# Patient Record
Sex: MALE | Race: BLACK OR AFRICAN AMERICAN
[De-identification: names, ages, dates, MRNs, and addresses within clinical notes are randomized per-mention and may not be internally consistent; named-entity substitution may affect disease eponyms.]

---

## 2020-05-07 ENCOUNTER — HOSPITAL ENCOUNTER (OUTPATIENT)
Dept: HOSPITAL 97 - ER | Age: 80
Setting detail: OBSERVATION
LOS: 1 days | Discharge: HOME | End: 2020-05-08
Attending: FAMILY MEDICINE | Admitting: FAMILY MEDICINE
Payer: COMMERCIAL

## 2020-05-07 VITALS — BODY MASS INDEX: 27.3 KG/M2

## 2020-05-07 DIAGNOSIS — Z72.89: ICD-10-CM
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
## 2020-05-07 DIAGNOSIS — Z23: ICD-10-CM

## 2020-05-07 DIAGNOSIS — I48.91: Primary | ICD-10-CM

## 2020-05-07 DIAGNOSIS — I10: ICD-10-CM

## 2020-05-07 DIAGNOSIS — F10.10: ICD-10-CM
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
## 2020-05-07 DIAGNOSIS — E78.5: ICD-10-CM

## 2020-05-07 DIAGNOSIS — K21.9: ICD-10-CM
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
## 2020-05-07 DIAGNOSIS — R78.1: ICD-10-CM

## 2020-05-07 DIAGNOSIS — J44.9: ICD-10-CM

## 2020-05-07 DIAGNOSIS — Z85.46: ICD-10-CM

## 2020-05-07 LAB
ALBUMIN SERPL BCP-MCNC: 3.6 G/DL (ref 3.4–5)
ALP SERPL-CCNC: 80 U/L (ref 45–117)
ALT SERPL W P-5'-P-CCNC: 32 U/L (ref 12–78)
AST SERPL W P-5'-P-CCNC: 34 U/L (ref 15–37)
BLD SMEAR INTERP: (no result)
BUN BLD-MCNC: 14 MG/DL (ref 7–18)
GLUCOSE SERPLBLD-MCNC: 96 MG/DL (ref 74–106)
HCT VFR BLD CALC: 47.3 % (ref 39.6–49)
INR BLD: 1.07
LYMPHOCYTES # SPEC AUTO: 0.5 K/UL (ref 0.7–4.9)
MAGNESIUM SERPL-MCNC: 1.9 MG/DL (ref 1.8–2.4)
METHAMPHET UR QL SCN: NEGATIVE
MORPHOLOGY BLD-IMP: (no result)
NT-PROBNP SERPL-MCNC: 406 PG/ML (ref ?–450)
PMV BLD: 9.7 FL (ref 7.6–11.3)
POTASSIUM SERPL-SCNC: 3.6 MMOL/L (ref 3.5–5.1)
RBC # BLD: 4.95 M/UL (ref 4.33–5.43)
THC SERPL-MCNC: NEGATIVE NG/ML
TROPONIN (EMERG DEPT USE ONLY): < 0.02 NG/ML (ref 0–0.04)

## 2020-05-07 PROCEDURE — 90670 PCV13 VACCINE IM: CPT

## 2020-05-07 PROCEDURE — 70450 CT HEAD/BRAIN W/O DYE: CPT

## 2020-05-07 PROCEDURE — 83735 ASSAY OF MAGNESIUM: CPT

## 2020-05-07 PROCEDURE — 80076 HEPATIC FUNCTION PANEL: CPT

## 2020-05-07 PROCEDURE — 80048 BASIC METABOLIC PNL TOTAL CA: CPT

## 2020-05-07 PROCEDURE — 96372 THER/PROPH/DIAG INJ SC/IM: CPT

## 2020-05-07 PROCEDURE — 71045 X-RAY EXAM CHEST 1 VIEW: CPT

## 2020-05-07 PROCEDURE — 80320 DRUG SCREEN QUANTALCOHOLS: CPT

## 2020-05-07 PROCEDURE — 84439 ASSAY OF FREE THYROXINE: CPT

## 2020-05-07 PROCEDURE — 80307 DRUG TEST PRSMV CHEM ANLYZR: CPT

## 2020-05-07 PROCEDURE — 83880 ASSAY OF NATRIURETIC PEPTIDE: CPT

## 2020-05-07 PROCEDURE — 85025 COMPLETE CBC W/AUTO DIFF WBC: CPT

## 2020-05-07 PROCEDURE — 84484 ASSAY OF TROPONIN QUANT: CPT

## 2020-05-07 PROCEDURE — 85610 PROTHROMBIN TIME: CPT

## 2020-05-07 PROCEDURE — 93005 ELECTROCARDIOGRAM TRACING: CPT

## 2020-05-07 PROCEDURE — 84443 ASSAY THYROID STIM HORMONE: CPT

## 2020-05-07 PROCEDURE — 72125 CT NECK SPINE W/O DYE: CPT

## 2020-05-07 PROCEDURE — 90471 IMMUNIZATION ADMIN: CPT

## 2020-05-07 PROCEDURE — 93306 TTE W/DOPPLER COMPLETE: CPT

## 2020-05-07 PROCEDURE — 71046 X-RAY EXAM CHEST 2 VIEWS: CPT

## 2020-05-07 PROCEDURE — 36415 COLL VENOUS BLD VENIPUNCTURE: CPT

## 2020-05-07 PROCEDURE — 99285 EMERGENCY DEPT VISIT HI MDM: CPT

## 2020-05-07 RX ADMIN — Medication SCH ML: at 21:27

## 2020-05-07 NOTE — RAD REPORT
EXAM DESCRIPTION:  CT - [id number] - 5/7/2020 4:04 pm

 

CLINICAL HISTORY:  fall, head and neck injury

 

COMPARISON:  HEAD BRAIN W O CONTRAST dated 8/23/2012

 

TECHNIQUE:  Axial 5 mm thick images of the head were obtained.  Axial 2 mm thick images of the cervic
al spine were obtained with sagittal and coronal reconstruction images generated and reviewed.

 

All CT scans are performed using dose optimization technique as appropriate and may include automated
 exposure control or mA/KV adjustment according to patient size.

 

FINDINGS:  No intracranial hemorrhage, mass, edema or acute intracranial finding. No suspicion for ac
saroj infarction. Moderate severity atrophy and chronic ischemic changes are present. These changes hav
e progressed from 2012. Ventricles are in proportion to volume loss. Mastoid air cells and paranasal 
sinuses are clear. No globe or orbit abnormality seen. No significant scalp hematoma or soft tissue i
njury.

 

Cervical bodies are normal in height. Significant C5-6 and C6-7 disc space narrowing and endplate spu
rring. Bilateral bony foraminal encroachment changes are present. Minimal C4-5 disc space narrowing. 
There is minimal retrolisthesis of C5 on C6. No fracture or acute bony abnormality.  Central canal de
tail is inherently limited.

 

No paraspinal mass or hematoma.

 

IMPRESSION:  Atrophy and chronic ischemic change progressive from the 2012 comparison. No acute intra
cranial finding.

 

Prominent cervical spine degenerative change without acute finding.

## 2020-05-07 NOTE — EDPHYS
Physician Documentation                                                                           

 [facility]                                                                 

Name: Clifton Gutiérrez                                                                                

Age: 80 yrs                                                                                       

Sex: Male                                                                                         

: 1940                                                                                   

MRN: [id number]                                                                                   

Arrival Date: 2020                                                                          

Time: 15:15                                                                                       

Account#: [id number]                                                                            

Bed 5                                                                                             

Private MD:                                                                                       

ED Physician Evaristo Garcia                                                                       

HPI:                                                                                              

                                                                                             

15:30 This 80 yrs old Black Male presents to ER via EMS with complaints of fall.              cp  

15:30 Details of fall: The patient fell from an upright position, while walking. Onset: The   cp  

      symptoms/episode began/occurred last night. Associated injuries: The patient sustained      

      injury to the head, pain, neck injury, pain with movement.                                  

15:30 Patient reports trip and fall times 2 yesterday evening while walking. Tripped over     cp  

      carpet on floor. Reports dizziness that is worse when moving head over past several         

      days. Denies loss of consciousness .                                                        

                                                                                                  

Historical:                                                                                       

- Allergies:                                                                                      

16:13 No Known Allergies;                                                                     ls4 

- Home Meds:                                                                                      

20:07 metoprolol tartrate 50 mg Oral tab 1 tab 2 times per day [Active]; losartan 25 mg oral  rv  

      tab 1 tab once daily [Active]; amlodipine 5 mg tab 1 tab once daily [Active];               

      lovastatin 20 mg Oral tab 1 tab once daily [Active];                                        

- PMHx:                                                                                           

16:13 GERD; High Cholesterol; Hypertension;                                                   ls4 

20:07 Atrial Fib; prostate cancer; Cancer;                                                    rv  

- PSHx:                                                                                           

16:13 chest surg; right hand pain;                                                            ls4 

                                                                                                  

- Immunization history:: Adult Immunizations up to date, Flu vaccine is up to date.               

- Social history:: Smoking status: Patient denies any tobacco usage or history of.                

                                                                                                  

                                                                                                  

ROS:                                                                                              

15:35 Constitutional: Negative for body aches, chills, fever, poor PO intake.                 cp  

15:35 Eyes: Negative for injury, pain, redness, and discharge.                                cp  

15:35 ENT: Negative for drainage from ear(s), ear pain, sore throat, difficulty swallowing,       

      difficulty handling secretions.                                                             

15:35 Neck: Positive for pain with movement.                                                      

15:35 Cardiovascular: Negative for chest pain, edema, palpitations.                               

15:35 Respiratory: Negative for cough, shortness of breath, wheezing.                             

15:35 Abdomen/GI: Negative for abdominal pain, nausea, vomiting, and diarrhea.                    

15:35 Back: Negative for pain at rest, pain with movement.                                        

15:35 MS/extremity: Negative for injury or acute deformity, decreased range of motion,            

      paresthesias.                                                                               

15:35 Skin: Negative for rash.                                                                    

15:35 Neuro: Positive for headache, Negative for altered mental status, dizziness, weakness.      

15:35 All other systems are negative.                                                             

                                                                                                  

Exam:                                                                                             

15:45 Constitutional: The patient appears in no acute distress, alert, awake,                 cp  

      non-diaphoretic, non-toxic, well developed, well nourished.                                 

15:45 Head/Face:  Normocephalic, atraumatic.                                                  cp  

15:45 Eyes: Periorbital structures: appear normal, Pupils: equal, round, and reactive to          

      light and accomodation, Extraocular movements: intact throughout, Conjunctiva: normal,      

      no exudate, no injection, Lids and lashes: appear normal, bilaterally.                      

15:45 ENT: External ear(s): are unremarkable, Nose: is normal, Mouth: Lips: moist, Oral           

      mucosa: moist, Posterior pharynx: is normal, airway is patent.                              

15:45 Neck: C-spine: vertebral tenderness, that is mild, appreciated at  C6 and C7, crepitus,     

      is not appreciated, ROM/movement: pain, that is mild, with any movement, limited range      

      of motion, is not appreciated, nuchal rigidity, is not appreciated.                         

15:45 Chest/axilla: Inspection: normal, Palpation: is normal, no crepitus, no tenderness.         

15:45 Cardiovascular: Rate: normal, Rhythm: irregular, Edema: is not appreciated, JVD: is not     

      appreciated.                                                                                

15:45 Respiratory: the patient does not display signs of respiratory distress,  Respirations:     

      normal, no use of accessory muscles, no retractions, labored breathing, is not present,     

      Breath sounds: are clear throughout, no decreased breath sounds.                            

15:45 Abdomen/GI: Inspection: abdomen appears normal, Bowel sounds: active, all quadrants,        

      Palpation: abdomen is soft and non-tender, in all quadrants.                                

15:45 Back: pain, is absent, ROM is normal.                                                       

15:45 Neuro: Orientation: to person, place \T\ time. Mentation: is normal, Cerebellar function: cp

      is grossly normal, Motor: moves all fours, strength is normal, Sensation: is normal.        

15:50 ECG was reviewed by the Attending Physician.                                            cp  

                                                                                                  

Vital Signs:                                                                                      

15:15  / 89; Pulse 97; Resp 14; Temp 97.9(O); Pulse Ox 99% on R/A; Weight 76.66 kg;     ls4 

      Height 5 ft. 6 in. (167.64 cm) (R); Pain 3/10;                                              

16:44  / 92; Pulse 95; Resp 14; Temp 98.0(O); Pulse Ox 99% on R/A; Pain 3/10;           ls4 

17:29  / 90; Pulse 88; Resp 14; Pulse Ox 99% on R/A; Pain 0/10;                         ls4 

18:20  / 96; Pulse 90; Resp 15; Pulse Ox 95% ;                                          bp  

19:00  / 96; Pulse 87; Resp 14; Temp 97.8(O); Pulse Ox 99% on R/A; Pain 0/10;           ls4 

20:27  / 99; Pulse 91; Resp 13; Pulse Ox 94% on R/A;                                    ls4 

15:15 Body Mass Index 27.28 (76.66 kg, 167.64 cm)                                             ls4 

                                                                                                  

MDM:                                                                                              

15:25 Patient medically screened.                                                             cp  

16:00 Differential diagnosis: closed head injury, contusion, fracture, multiple trauma.       cp  

16:50 Data reviewed: vital signs, nurses notes, lab test result(s), EKG, radiologic studies,  cp  

      and as a result, I will admit patient.                                                      

16:50 Test interpretation: by ED physician or midlevel provider: ECG.                         cp  

16:51 Physician consultation: Abdullahi Mcfarland DO was called at 16:51, left message on voicemail.cp  

                                                                                                  

                                                                                             

15:27 Order name: Basic Metabolic Panel; Complete Time: 16:46                                 cp  

                                                                                             

15:27 Order name: CBC with Diff                                                               cp  

                                                                                             

16:46 Interpretation: Normal except: ALINA% 86.9; LYM% 7.1; LYMA 0.5.                           cp  

                                                                                             

15:27 Order name: Hepatic Function; Complete Time: 16:46                                      cp  

                                                                                             

16:47 Interpretation: Normal except: GLOB 4.1; A/G 0.9.                                       cp  

                                                                                             

15:27 Order name: Magnesium; Complete Time: 16:46                                             cp  

                                                                                             

15:27 Order name: NT PRO-BNP; Complete Time: 16:46                                            cp  

                                                                                             

15:27 Order name: Protime (+inr); Complete Time: 16:46                                        cp  

                                                                                             

15:27 Order name: Troponin (emerg Dept Use Only); Complete Time: 16:46                        cp  

                                                                                             

18:12 Order name: Alcohol Serum/Plasma                                                        EDMS

                                                                                             

18:12 Order name: Urine Drug Screen                                                           EDMS

                                                                                             

18:12 Order name: Basic Metabolic Panel                                                       EDMS

                                                                                             

18:12 Order name: Basic Metabolic Panel                                                       EDMS

                                                                                             

18:12 Order name: Basic Metabolic Panel                                                       EDMS

                                                                                             

18:13 Order name: CBC with Automated Diff                                                     EDMS

                                                                                             

18:13 Order name: CBC with Automated Diff                                                     EDMS

                                                                                             

15:27 Order name: CT Head C Spine; Complete Time: 16:22                                       cp  

                                                                                             

15:27 Order name: XRAY Chest (1 view); Complete Time: 16:22                                   cp  

                                                                                             

18:12 Order name: Echo with Doppler                                                           EDMS

                                                                                             

18:13 Order name: CBC with Automated Diff                                                     EDMS

                                                                                             

18:13 Order name: T4 Free                                                                     EDMS

                                                                                             

18:13 Order name: T4 Free                                                                     EDMS

                                                                                             

18:13 Order name: Thyroid Stimulating Hormone                                                 EDMS

                                                                                             

18:13 Order name: Thyroid Stimulating Hormone                                                 EDMS

                                                                                             

18:13 Order name: Troponin I                                                                  EDMS

                                                                                             

18:13 Order name: Troponin I                                                                  EDMS

                                                                                             

18:13 Order name: Troponin I                                                                  EDMS

                                                                                             

18:18 Order name: Magnesium                                                                   EDMS

                                                                                             

18:18 Order name: Magnesium                                                                   EDMS

                                                                                             

18:18 Order name: Magnesium                                                                   EDMS

                                                                                             

19:29 Order name: CBC Smear Scan                                                              EDMS

                                                                                             

15:27 Order name: EKG; Complete Time: 15:29                                                   cp  

07                                                                                             

15:27 Order name: Cardiac monitoring; Complete Time: 17:35                                    cp  

[id number]                                                                                             

15:27 Order name: EKG - Nurse/Tech; Complete Time: 17:35                                      cp  

[id number]                                                                                             

15:27 Order name: IV Saline Lock; Complete Time: 17:35                                        cp  

05/07                                                                                             

15:27 Order name: Labs collected and sent; Complete Time: 17:35                               cp  

05/07                                                                                             

15:27 Order name: O2 Per Protocol; Complete Time: 17:35                                       cp  

[id number]                                                                                             

15:27 Order name: O2 Sat Monitoring; Complete Time: 17:35                                     cp  

[id number]                                                                                             

18:12 Order name: CONS Physician Consult                                                      [facility]

                                                                                             

18:12 Order name: Heart Healthy                                                               [facility]

                                                                                             

18:12 Order name: EKG Electrocardiogram                                                       [facility]

                                                                                             

18:12 Order name: EKG Electrocardiogram                                                       [facility]

                                                                                                  

ECG:                                                                                              

15:50 Rate is 91 beats/min. Rhythm is irregularly irregular. QRS interval is normal. QT       cp  

      interval is normal. T waves are Inverted in leads III, aVF, aVR. Interpreted by me.         

      Reviewed by me.                                                                             

                                                                                                  

Administered Medications:                                                                         

16:23 Drug: Lopressor (metoprolol TARTRATE) 25 mg Route: PO;                                  ls4 

17:01 Drug: Lovenox 80 mg Route: Sub-Q; Site: left lower abdomen;                             ls4 

17:30 Follow up: Response: No adverse reaction; Marked relief of symptoms                     ls4 

17:01 Drug: Tylenol 650 mg Route: PO;                                                         ls4 

17:30 Follow up: Response: No adverse reaction; Marked relief of symptoms; Pain is decreased  ls4 

17:01 Drug: Aspirin Chewable Tablet 324 mg Route: PO;                                         ls4 

17:30 Follow up: Response: No adverse reaction; Marked relief of symptoms; Pain is decreased  ls4 

                                                                                                  

                                                                                                  

Disposition:                                                                                      

                                                                                             

01:25 Co-signature as Attending Physician, Evaristo Garcia MD I agree with the assessment and   kdr 

      plan of care.                                                                               

                                                                                                  

Disposition:                                                                                      

20 17:35 Hospitalization ordered by Abdullahi Mcfarland for Observation. Preliminary             

  diagnosis is Unspecified atrial fibrillation.                                                   

- Bed requested for Telemetry/MedSurg (observation).                                              

- Status is Observation.                                                                      ls4 

- Condition is Stable.                                                                            

- Problem is new.                                                                                 

- Symptoms have improved.                                                                         

                                                                                                  

                                                                                                  

                                                                                                  

Signatures:                                                                                       

Dispatcher [employer]                           [facility]                                                 

Zuleyma Luna RN RN dw Rittger, Kevin, MD MD   [facility]                                                  

Fortino Soares PA PA   cp                                                   

Valentino Ni RN RN   rv                                                   

Kacey Goodman RN                       RN   ls4                                                  

                                                                                                  

Corrections: (The following items were deleted from the chart)                                    

                                                                                             

16:46 16:46 Normal except: ALINA% 86.9; LYM% 7.1. cp                                            cp  

18:17 17:35 Hospitalization Ordered by Abdullahi Mcfarland DO for Observation. Preliminary          dw  

      diagnosis is Unspecified atrial fibrillation. Bed requested for Telemetry/MedSurg           

      (observation). Status is Observation. Condition is Stable. Problem is new. Symptoms         

      have improved. cp                                                                           

20: 16:13 Home Meds: lisinopril 10 mg Oral tab 1 tab once daily; ls4                        rv  

20:07 16:13 Home Meds: metoprolol tartrate 50 mg Oral tab 1 tab 2 times per day; ls4          rv  

20: 16:13 Home Meds: pantoprazole 40 mg Oral TbEC 1 tab once daily; ls4                     rv  

20: 16:13 Home Meds: unknown cholesterol medication; ls4                                    rv  

20:41 18:17 2020 17:35 Hospitalization Ordered by Abdullahi Mcafrland DO for Observation.     ls4 

      Preliminary diagnosis is Unspecified atrial fibrillation. Bed requested for                 

      Telemetry/MedSurg (observation). Status is Observation. Condition is Stable. Problem is     

      new. Symptoms have improved. dw                                                             

                                                                                                  

**************************************************************************************************

## 2020-05-07 NOTE — XMS REPORT
Patient Summary Document

                             Created on:May 7, 2020



Patient:ALEJANDRA JUAREZ

Sex:Male

:1940

External Reference #:[id number]





Demographics







                          Address                   [address] [place] [address] [address]



                                                    [place] [address]

 

                          Home Phone                [phone]

 

                          Email Address             NONE

 

                          Preferred Language        Unknown

 

                          Marital Status            Unknown

 

                          Muslim Affiliation     Unknown

 

                          Race                      Unknown

 

                          Additional Race(s)        Unavailable

 

                          Ethnic Group              Unknown









Author







                          Organization              [facility]

t

 

                          Address                   [address]



                                                    [place] [address]

 

                          Phone                     [phone]









Care Team Providers







                    Name                Role                Phone

 

                    Unavailable         Unavailable         Unavailable









Problems

This patient has no known problems.



Allergies, Adverse Reactions, Alerts

This patient has no known allergies or adverse reactions.



Medications

This patient has no known medications.

## 2020-05-07 NOTE — ER
Nurse's Notes                                                                                     

 [facility]                                                                 

Name: Clifton Gutiérrez                                                                                

Age: 80 yrs                                                                                       

Sex: Male                                                                                         

: 1940                                                                                   

MRN: [id number]                                                                                   

Arrival Date: 2020                                                                          

Time: 15:15                                                                                       

Account#: [id number]                                                                            

Bed 5                                                                                             

Private MD:                                                                                       

Diagnosis: Unspecified atrial fibrillation                                                        

                                                                                                  

Presentation:                                                                                     

                                                                                             

15:15 Chief complaint: EMS states: PT HAD A FALL LAST EVENING. PT HIT HEAD AND NOW NECK AND   ls4 

      HEAD HURT WHEN HE TURNS HIS HEAD. PT STATES HIS FOOT GOT STUCK ON THE TAPE THAT HOLDS       

      HIS CARPET DOWN TO THE FLOOR. PT DENIES DIZZINESS BEFORE FALL BUT NOW HAS SOME              

      DIZZINESS. PT DOES NOT TAKE BLOOD THINNERS OR ASPIRIN. Coronavirus screen: Proceed with     

      normal triage. Patient denies a cough. Patient denies shortness of breath or difficulty     

      breathing. Patient denies measured and/or subjective temperature greater than 100.4F        

      prior to today's visit. Patient denies travel on a cruise ship or to a country the [facility]      

      currently lists as an affected area. Patient denies contact with known and/or suspected     

      case of COVID-19. Ebola Screen: No symptoms or risks identified at this time. Initial       

      Sepsis Screen: Does the patient meet any 2 criteria? No. Patient's initial sepsis           

      screen is negative. Does the patient have a suspected source of infection? No.              

      Patient's initial sepsis screen is negative. Risk Assessment: Do you want to hurt           

      yourself or someone else? Patient reports no desire to harm self or others. Onset of        

      symptoms was May 06, 2020 at 18:00. Care prior to arrival: None. Activity prior to          

      arrival: None.                                                                              

15:15 Method Of Arrival: EMS: [facility]                                                    ls4 

15:15 Acuity: YASMEEN 3                                                                           ls4 

                                                                                                  

Triage Assessment:                                                                                

16:18 General: Appears in no apparent distress. comfortable, Behavior is calm, cooperative.   ls4 

      Pain: Complains of pain in left parietal area, right parietal area, occipital area,         

      base of the skull and right base of the skull Pain currently is 3 out of 10 on a pain       

      scale. at worst was 7 out of 10 on a pain scale. Neuro: No deficits noted.                  

      Cardiovascular: Denies chest pain, diaphoresis, fatigue, lightheadedness, nausea,           

      palpitations, shortness of breath, syncope, vomiting, Capillary refill < 3 seconds          

      Clubbing of nail beds is absent Thorax Patient's skin is warm and dry. Rhythm is atrial     

      fibrillation With PVC's. Respiratory: Airway is patent Respiratory effort is even,          

      unlabored, Respiratory pattern is regular, Breath sounds are clear bilaterally. GI: No      

      deficits noted. No signs and/or symptoms were reported involving the gastrointestinal       

      system. : No deficits noted. No signs and/or symptoms were reported regarding the         

      genitourinary system. Derm: No deficits noted. No signs and/or symptoms reported            

      regarding the dermatologic system. Musculoskeletal: No deficits noted. No signs and/or      

      symptoms reported regarding the musculoskeletal system.                                     

                                                                                                  

Historical:                                                                                       

- Allergies:                                                                                      

16:13 No Known Allergies;                                                                     ls4 

- Home Meds:                                                                                      

20:07 metoprolol tartrate 50 mg Oral tab 1 tab 2 times per day [Active]; losartan 25 mg oral  rv  

      tab 1 tab once daily [Active]; amlodipine 5 mg tab 1 tab once daily [Active];               

      lovastatin 20 mg Oral tab 1 tab once daily [Active];                                        

- PMHx:                                                                                           

16:13 GERD; High Cholesterol; Hypertension;                                                   ls4 

20:07 Atrial Fib; prostate cancer; Cancer;                                                    rv  

- PSHx:                                                                                           

16:13 chest surg; right hand pain;                                                            ls4 

                                                                                                  

- Immunization history:: Adult Immunizations up to date, Flu vaccine is up to date.               

- Social history:: Smoking status: Patient denies any tobacco usage or history of.                

                                                                                                  

                                                                                                  

Screening:                                                                                        

15:20 Fall Risk None identified.                                                              ls4 

16:10 Abuse screen: Denies threats or abuse. Denies injuries from another. Nutritional        ls4 

      screening: No deficits noted. Tuberculosis screening: No symptoms or risk factors           

      identified.                                                                                 

                                                                                                  

Assessment:                                                                                       

17:35 Reassessment: Patient appears in no apparent distress at this time. No changes from     ls4 

      previously documented assessment. Patient and/or family updated on plan of care and         

      expected duration. Pain level reassessed. Patient is alert, oriented x 3, equal             

      unlabored respirations, skin warm/dry/pink. General: Appears in no apparent distress.       

      comfortable. Respiratory: Airway is patent Respiratory effort is even, unlabored,           

      Respiratory pattern is regular, Breath sounds are clear bilaterally. Denies cough,          

      shortness of breath labored breathing, pain with respiration, pain with cough, pain         

      with movement, air hunger.                                                                  

18:20 Reassessment: ADMIT IN PROCESS. BED ASSIGNED FOR FOLLOWING SHIFT. VS STABLE ON MONITOR. bp  

19:02 Reassessment: Patient appears in no apparent distress at this time. No changes from     ls4 

      previously documented assessment. Patient and/or family updated on plan of care and         

      expected duration. Pain level reassessed. Patient is alert, oriented x 3, equal             

      unlabored respirations, skin warm/dry/pink. CALLED TO GIVE REPORT. TORY ANSWERED, PUT       

      ON HOLD AND WILL CALL BACK AFTER SHIFT CHANGE.                                              

                                                                                                  

Vital Signs:                                                                                      

15:15  / 89; Pulse 97; Resp 14; Temp 97.9(O); Pulse Ox 99% on R/A; Weight 76.66 kg;     ls4 

      Height 5 ft. 6 in. (167.64 cm) (R); Pain 3/10;                                              

16:44  / 92; Pulse 95; Resp 14; Temp 98.0(O); Pulse Ox 99% on R/A; Pain 3/10;           ls4 

17:29  / 90; Pulse 88; Resp 14; Pulse Ox 99% on R/A; Pain 0/10;                         ls4 

18:20  / 96; Pulse 90; Resp 15; Pulse Ox 95% ;                                          bp  

19:00  / 96; Pulse 87; Resp 14; Temp 97.8(O); Pulse Ox 99% on R/A; Pain 0/10;           ls4 

20:27  / 99; Pulse 91; Resp 13; Pulse Ox 94% on R/A;                                    ls4 

15:15 Body Mass Index 27.28 (76.66 kg, 167.64 cm)                                             ls4 

                                                                                                  

ED Course:                                                                                        

15:15 Patient arrived in ED.                                                                  em1 

15:18 Fortino Soares PA is PHCP.                                                                cp  

15:18 Evaristo Garcia MD is Attending Physician.                                              cp  

15:20 Patient has correct armband on for positive identification. Bed in low position. Call   ls4 

      light in reach. Side rails up X 1. Cardiac monitor on. Pulse ox on. NIBP on. Warm           

      blanket given. Verbal reassurance given. Diet: Patient is NPO.                              

15:20 No provider procedures requiring assistance completed. Inserted saline lock: 18 gauge   ls4 

      in right forearm, using aseptic technique. Blood collected. Patient maintains SpO2          

      saturation greater than 95% on room air.                                                    

15:44 XRAY Chest (1 view) In Process Unspecified.                                             EDMS

16:05 CT Head C Spine In Process Unspecified.                                                 EDMS

16:06 Kacey Goodman, RN is Primary Nurse.                                                     ls4 

16:10 Triage completed.                                                                       ls4 

16:11 Arm band placed on.                                                                     ls4 

17:35 Abdullahi Mcfarland DO is Hospitalizing Provider.                                           cp  

20:21 Patient admitted, IV remains in place.                                                  ls4 

                                                                                                  

Administered Medications:                                                                         

16:23 Drug: Lopressor (metoprolol TARTRATE) 25 mg Route: PO;                                  ls4 

17:01 Drug: Lovenox 80 mg Route: Sub-Q; Site: left lower abdomen;                             ls4 

17:30 Follow up: Response: No adverse reaction; Marked relief of symptoms                     ls4 

17:01 Drug: Tylenol 650 mg Route: PO;                                                         ls4 

17:30 Follow up: Response: No adverse reaction; Marked relief of symptoms; Pain is decreased  ls4 

17:01 Drug: Aspirin Chewable Tablet 324 mg Route: PO;                                         ls4 

17:30 Follow up: Response: No adverse reaction; Marked relief of symptoms; Pain is decreased  ls4 

                                                                                                  

                                                                                                  

Outcome:                                                                                          

17:35 Decision to Hospitalize by Provider.                                                    cp  

20:18 Admitted to Tele accompanied by tech, room 206, with chart, Report called to  PUSHPA toledo RN                                                                                          

20:18 Condition: stable                                                                           

20:18 Instructed on the need for admit, SPOKE TO PT WIFE.  PT WIFE UPDATED MEDLIST.  PT IS        

      COMPLIANT WITH MEDICATIONS AS PER WIFE.  PT NO LONGER ON OMEPRAZOLE OR LISINIPRIL.          

      WIFE CONFIRMED THAT HE HAD 2 MECHANICAL FALLS LAST EVENING WHEN TAPE FROM AN AREA RUG       

      WAS STUCK TO HIS SHOE AND ON THE 2ND FALL HE HIT THE COFFEE TABLE WITH HIS HEAD.  (PT       

      HAS NO VISIBLE OR PALPABLE INJURY) Demonstrated understanding of instructions.              

20:41 Patient left the ED.                                                                    ls4 

                                                                                                  

Signatures:                                                                                       

Dispatcher [employer]                           Rk Lawton                               em1                                                  

Fortino Soares PA PA   cp                                                   

Dilan Iglesias RN                      RN   bp                                                   

Valentino Ni RN RN   rv                                                   

Kacey Goodman RN                       RN   ls4                                                  

                                                                                                  

Corrections: (The following items were deleted from the chart)                                    

20:07 16:13 Home Meds: lisinopril 10 mg Oral tab 1 tab once daily; ls4                        rv  

20: 16:13 Home Meds: metoprolol tartrate 50 mg Oral tab 1 tab 2 times per day; ls4          rv  

20: 16:13 Home Meds: pantoprazole 40 mg Oral TbEC 1 tab once daily; ls4                     rv  

20: 16:13 Home Meds: unknown cholesterol medication; ls4                                    rv  

                                                                                                  

**************************************************************************************************

## 2020-05-07 NOTE — P.HP
Certification for Inpatient


Patient admitted to: Observation


With expected LOS: <2 Midnights


Patient will require the following post-hospital care: None


Practitioner: I am a practitioner with admitting privileges, knowledge of 

patient current condition, hospital course, and medical plan of care.


Services: Services provided to patient in accordance with Admission requirements

found in Title 42 Section 412.3 of the Code of Federal Regulations





<Dany Holley - Last Filed: 05/07/20 18:11>





Patient History


Date of Service: 05/07/20


Primary Care Provider: Unknown


Reason for admission: New onset atrial fibrillation, dizziness


History of Present Illness: 





80-year-old  male with history of hypertension, GERD, 

hyperlipidemia presented to the emergency department 2 days status post 

mechanical fall over the carpet in his living room with complaint of dizziness. 

During his evaluation in the emergency department the patient was found to have 

atrial fibrillation.  This was not present on his prior EKGs.  Patient received 

cardiac workup while in the emergency department which did not show any 

elevation in his troponin or BNP, chest x-ray was without any acute findings.  

At this time ER provider requested admission for further evaluation  and 

management of this patient's condition.





When I saw the patient in the immediate emergency department he appeared calm, 

cooperative, and in no distress.  The patient reported that around noon today he

began to notice that he is feeling dizzy but denies any chest pain or 

palpitations.  Patient's heart rate was in the 90s.  Patient reports that he has

not had this feeling before, denies any known history of atrial fibrillation.  

Patient states that he does not take any blood thinners currently.  Patient will

be admitted for further evaluation management of his new onset atrial 

fibrillation.


Home medications list reviewed: Yes





- Past Medical/Surgical History


Has patient received pneumonia vaccine in the past: No


Diabetic: No


-: Hypertension


-: GERD


-: Hyperlipidemia


Past Surgical History: Reviewed- Non-Contributory


Psychosocial/ Personal History: Patient lives at home with family.





- Family History


Family History: Reviewed- Non-Contributory





- Social History


Smoking Status: Never smoker


Alcohol use: Yes


CD- Drugs: No


Caffeine use: Yes


Place of Residence: Home





<Dany Holley - Last Filed: 05/07/20 18:11>


Date of Service: 05/07/20





<Abdullahi Mcfarland - Last Filed: 05/07/20 18:29>


Allergies





No Known Allergies Allergy (Unverified 08/23/12 18:57)


   





Home Medications: 








Lisinopril 10 mg PO DAILY 08/23/12 


Metoprolol Tartrate 50 mg PO BID 08/23/12 


Baclofen 10 mg PO DAILYPRN PRN #0 tablet 08/25/12 


Lisinopril 10 mg PO DAILY #0 tablet 08/25/12 


Metoprolol Tartrate [Lopressor] 50 mg PO BID #0 tablet 08/25/12 


Pantoprazole [Protonix Tab] 40 mg PO BID #0 tab 08/25/12 








Review of Systems


General: Unremarkable


Eyes: Unremarkable


ENT: Unremarkable


Respiratory: Unremarkable


Cardiovascular: Unremarkable


Gastrointestinal: Unremarkable


Genitourinary: Unremarkable


Musculoskeletal: Unremarkable


Integumentary: Unremarkable


Neurological: Other (Lightheadedness), As per HPI


Lymphatics: Unremarkable





<Dany Holley - Last Filed: 05/07/20 18:11>





Physical Examination





- Physical Exam


General: Alert, In no apparent distress, Oriented x3


HEENT: Atraumatic, Normocephalic


Neck: Supple


Respiratory: Clear to auscultation bilaterally, Normal air movement


Cardiovascular: No edema, Normal S1 S2, Irregular heart rate/rhythm


Capillary refill: <2 Seconds


Gastrointestinal: Normal bowel sounds, Soft and benign


Musculoskeletal: No clubbing, No swelling, No contractures


Integumentary: No significant lesion


Neurological: Normal speech, Normal tone





- Studies


Laboratory Data (last 24 hrs)





05/07/20 15:55: PT 12.6 H, INR 1.07


05/07/20 15:55: WBC 7.2, Hgb 15.9, Hct 47.3, Plt Count 183


05/07/20 15:55: Sodium 140, Potassium 3.6, BUN 14, Creatinine 0.89, Glucose 96, 

Magnesium 1.9, Total Bilirubin 0.5, AST 34, ALT 32, Alkaline Phosphatase 80








<Dany Holley - Last Filed: 05/07/20 18:11>





- Studies


Laboratory Data (last 24 hrs)





05/07/20 15:55: PT 12.6 H, INR 1.07


05/07/20 15:55: WBC 7.2, Hgb 15.9, Hct 47.3, Plt Count 183


05/07/20 15:55: Sodium 140, Potassium 3.6, BUN 14, Creatinine 0.89, Glucose 96, 

Magnesium 1.9, Total Bilirubin 0.5, AST 34, ALT 32, Alkaline Phosphatase 80








<Abdullahi Mcfarland - Last Filed: 05/07/20 18:29>





Assessment and Plan





- Plan


Assessment


Dizziness likely secondary to new onset atrial fibrillation not on chronic 

anticoagulation


Hypertension


GERD


Hyperlipidemia


Plan


Dizziness likely secondary to new onset atrial fibrillation not on chronic 

anticoagulation- patient received a CT scan of head and neck in the emergency 

department to rule out any traumatic causes of his dizziness.  Patient states he

did not know when the patient fibrillation began as he did not have any 

palpitations or chest pain.  Will place patient on Lovenox 1 milligram/kilogram 

twice daily and obtain an echocardiogram to evaluate heart function.  Will 

assess thyroid function with the morning labs and obtain a urine drug screen in 

addition to a serum alcohol level.  Cardiology has been consulted on this case 

and will assist in determining the need for chronic anticoagulation.  Patient 

will remain on telemetry throughout this hospitalization. 


Hypertension- patient takes metoprolol 50 mg once daily at home and lisinopril, 

blood pressure is on the low end of normal in the emergency department.  Will 

resume metoprolol at 25 mg once daily and continue to monitor blood pressure.


GERD- will obtain and continue patient's home medication.


Hyperlipidemia-will obtain and continue patient's home medication.


Discharge Plan: Home


Plan to discharge in: 24 Hours





- Advance Directives


Does patient have a Living Will: No


Does patient have a Durable POA for Healthcare: No





- Code Status/Comfort Care


Code Status Assessed: Yes (Patient is full code)


Critical Care: No


Time Spent Managing Pts Care (In Minutes): 55





<Dany Holley - Last Filed: 05/07/20 18:11>





- Plan


Case discussed at length with nurse practitioner.  Agree with current plan of 

care.  Patient likely with new onset atrial fibrillation.  Will consult 

cardiology.  Cardiology informed.  Will continue with metoprolol at this time.  

Patient currently on Lovenox.  This can be transition to chronic anti 

coagulation therapy-Xarelto or Eliquis at discharge. Await further 

recommendations from cardiology.  Will obtain echocardiogram.  Will also check 

thyroid panel.  Patient with history of hypertension, GERD, alcohol.  Will 

obtain urine drug screen and alcohol level.





<Abdullahi Mcfarland - Last Filed: 05/07/20 18:29>

## 2020-05-07 NOTE — RAD REPORT
EXAM DESCRIPTION:  RAD - Chest Single View - 5/7/2020 3:44 pm

 

CLINICAL HISTORY:  fall, chest pain

 

COMPARISON:  Lie 2017

 

TECHNIQUE:  AP portable chest image was obtained 5/7/2020 3:44 pm .

 

FINDINGS:  Lung volumes are low. No pulmonary contusion or focal lung parenchymal process. Interstiti
al markings are not substantially different. Heart and vasculature are normal. No measurable pleural 
effusion and no pneumothorax. Bone detail is limited. Directed imaging can be performed as warranted.
 No acute aortic findings suspected.

 

IMPRESSION:  No acute cardiopulmonary process.

## 2020-05-08 VITALS — DIASTOLIC BLOOD PRESSURE: 102 MMHG | TEMPERATURE: 96.7 F | SYSTOLIC BLOOD PRESSURE: 157 MMHG

## 2020-05-08 VITALS — OXYGEN SATURATION: 98 %

## 2020-05-08 LAB
BUN BLD-MCNC: 13 MG/DL (ref 7–18)
GLUCOSE SERPLBLD-MCNC: 92 MG/DL (ref 74–106)
HCT VFR BLD CALC: 45.2 % (ref 39.6–49)
LYMPHOCYTES # SPEC AUTO: 0.7 K/UL (ref 0.7–4.9)
MAGNESIUM SERPL-MCNC: 1.6 MG/DL (ref 1.8–2.4)
PMV BLD: 9.8 FL (ref 7.6–11.3)
POTASSIUM SERPL-SCNC: 4 MMOL/L (ref 3.5–5.1)
RBC # BLD: 4.67 M/UL (ref 4.33–5.43)
TROPONIN I: < 0.02 NG/ML (ref 0–0.04)
TSH SERPL DL<=0.05 MIU/L-ACNC: 0.9 UIU/ML (ref 0.36–3.74)

## 2020-05-08 RX ADMIN — ENOXAPARIN SODIUM SCH MG: 80 INJECTION SUBCUTANEOUS at 17:11

## 2020-05-08 RX ADMIN — ACETAMINOPHEN PRN MG: 500 TABLET, FILM COATED ORAL at 11:42

## 2020-05-08 RX ADMIN — ACETAMINOPHEN PRN MG: 500 TABLET, FILM COATED ORAL at 15:41

## 2020-05-08 RX ADMIN — ENOXAPARIN SODIUM SCH MG: 80 INJECTION SUBCUTANEOUS at 05:24

## 2020-05-08 RX ADMIN — Medication SCH ML: at 08:05

## 2020-05-08 RX ADMIN — ACETAMINOPHEN PRN MG: 500 TABLET, FILM COATED ORAL at 08:28

## 2020-05-08 NOTE — EKG
Test Date:    2020-05-08               Test Time:    10:18:47

Technician:   RADHA                                     

                                                     

MEASUREMENT RESULTS:                                       

Intervals:                                           

Rate:         102                                    

OH:                                                  

QRSD:         78                                     

QT:           308                                    

QTc:          401                                    

Axis:                                                

P:                                                   

OH:                                                  

QRS:          -46                                    

T:            -5                                     

                                                     

INTERPRETIVE STATEMENTS:                                       

                                                     

Atrial fibrillation with rapid ventricular response

Left axis deviation

Abnormal ECG

Compared to ECG 05/07/2020 15:45:21

Left-axis deviation now present

Right-axis deviation no longer present

Left ventricular hypertrophy no longer present

T-wave abnormality no longer present

Possible ischemia no longer present



Electronically Signed On 05-08-20 17:04:27 CDT by Anoop Ham

## 2020-05-08 NOTE — ECHO
HEIGHT: 5 ft 5 in   WEIGHT: 164 lb 0 oz   DATE OF STUDY: 05/08/2020   REFER DR: Dany Holley NP

2-DIMENSIONAL: YES

     M.MODE: YES

 DOPPLER: YES

COLOR FLOW: YES



                    TDS:  

PORTABLE: 

 DEFINITY:  

BUBBLE STUDY: 





DIAGNOSIS:  NEW ONSET ATRIAL FIBRILLATION



CARDIAC HISTORY:  

CATHERIZATION: NO

SURGERY: NO

PROSTHETIC VALVE: NO

PACEMAKER: NO





MEASUREMENTS (cm)

    DIASTOLIC (NORMALS)             SYSTOLIC (NORMALS)

IVSd                 0.9 (0.6-1.2)                    LA Diam 3.6 (1.9-4.0)     LVEF       
  63%  

LVIDd               4.7 (3.5-5.7)                        LVIDs      3.1 (2.0-3.5)     %FS  
        34%

LVPWd             1.0 (0.6-1.2)

Ao Diam           3.2 (2.0-3.7)



2 DIMENSIONAL ASSESSMENT:

RIGHT ATRIUM:                   NORMAL

LEFT ATRIUM:       NORMAL



RIGHT VENTRICLE:            NORMAL

LEFT VENTRICLE: NORMAL



TRICUSPID VALVE:             NORMAL

MITRAL VALVE:     NORMAL



PULMONIC VALVE:             NORMAL

AORTIC VALVE:     NORMAL



PERICARDIAL EFFUSION: NONE

AORTIC ROOT:      NORMAL





LEFT VENTRICULAR WALL MOTION:     NORMAL



DOPPLER/COLOR FLOW:     NORMAL



COMMENTS:      ATRIAL FIBRILLATION.  NORMAL LEFT VENTRICULAR SIZE AND FUNCTION.  

                            NO WALL MOTION ABNORMALITY.  NO EFFUSION.



TECHNOLOGIST:   JONATHAN KELSEY

## 2020-05-08 NOTE — P.DS
Admission Date: 05/07/20


Discharge Date: 05/08/20


Primary Care Provider: Unknown


Disposition: ROUTINE DISCHARGE


Discharge Condition: GOOD


Reason for Admission: New onset atrial fibrillation, dizziness


Consultations: 





Cardiology-Dr. Ham





Procedures: 





CT Head: 


FINDINGS:  No intracranial hemorrhage, mass, edema or acute intracranial 

finding. No suspicion for acute infarction. Moderate severity atrophy and 

chronic ischemic changes are present. These changes have progressed from 2012. 

Ventricles are in proportion to volume loss. Mastoid air cells and paranasal 

sinuses are clear. No globe or orbit abnormality seen. No significant scalp 

hematoma or soft tissue injury. 


Cervical bodies are normal in height. Significant C5-6 and C6-7 disc space 

narrowing and endplate spurring. Bilateral bony foraminal encroachment changes 

are present. Minimal C4-5 disc space narrowing. There is minimal retrolisthesis 

of C5 on C6. No fracture or acute bony abnormality.  Central canal detail is 

inherently limited. 


No paraspinal mass or hematoma. 


IMPRESSION:  Atrophy and chronic ischemic change progressive from the 2012 

comparison. No acute intracranial finding. 


Prominent cervical spine degenerative change without acute finding.





ECHO: 


EF 63%


FINDINGS:  No intracranial hemorrhage, mass, edema or acute intracranial 

finding. No suspicion for acute infarction. Moderate severity atrophy and 

chronic ischemic changes are present. These changes have progressed from 2012. 

Ventricles are in proportion to volume loss. Mastoid air cells and paranasal 

sinuses are clear. No globe or orbit abnormality seen. No significant scalp 

hematoma or soft tissue injury. 


Cervical bodies are normal in height. Significant C5-6 and C6-7 disc space 

narrowing and endplate spurring. Bilateral bony foraminal encroachment changes 

are present. Minimal C4-5 disc space narrowing. There is minimal retrolisthesis 

of C5 on C6. No fracture or acute bony abnormality.  Central canal detail is 

inherently limited. 


No paraspinal mass or hematoma. 


IMPRESSION:  Atrophy and chronic ischemic change progressive from the 2012 

comparison. No acute intracranial finding. 


Prominent cervical spine degenerative change without acute finding.





CXR: 


COMPARISON:  Chest Single View dated 5/7/2020; Chest Single View dated 7/8/2017;

CHEST SINGLE VIEW dated 6/4/2015; CHEST PA AND LAT 2 VIEW dated 8/25/2014 


FINDINGS:  The lungs are clear. The heart is mildly prominent size with a 

tortuous thoracic aorta. No displaced fractures.





Medical problem list:


Dizziness likely secondary to new onset atrial fibrillation now on chronic anti 

coagulation therapy


Hypertension


GERD


Hyperlipidemia


Alcohol abuse


Brief History of Present Illness: 





72-year-old  male with history of hypertension presented with 

dizziness.  Patient found to have atrial fibrillation patient was admitted for 

further evaluation.


Hospital Course: 





Patient presented with dizziness.  Patient found to have new onset atrial 

fibrillation.  Patient was admitted for further evaluation.  Cardiology 

recommended rate control medication-metoprolol which he has been taking.  

Patient also started on anti coagulation therapy.  Patient has done well.  

Echocardiogram unremarkable. At discharge patient will continue with Toprol-XL 

50 mg 1 pill twice daily and Eliquis 5 mg 1 pill twice daily.  Education on a to

fibrillation, Eliquis will be provided.  Recommend follow up with Cardiology in 

1 week to follow up this hospitalization.





Patient with underlying hypertension.  This has remained stable.  At discharge 

he will continue with Toprol-XL 50 mg 1 pill twice daily, Norvasc 5 mg daily, 

and Cozaar 25 mg daily.  Recommend to maintain blood pressure less 150/80.  

Further adjustment to be done by his PCP or cardiology.





Patient with GERD.  At discharge patient may continue with his medication 

Protonix 40 mg daily.





Patient with hyperlipidemia.  This is remained stable.  At discharge will 

continue with lovastatin 20 mg daily.





Patient with elevated alcohol level.  Alcohol cessation education provided.  It 

will be important patient ceases alcohol as patient will be on chronic anti 

coagulation therapy.  This could be further monitored by cardiology and PCP.





Vital Signs/Physical Exam: 














Temp Pulse Resp BP Pulse Ox


 


 97.2 F   117 H  20   133/97 H  97 


 


 05/08/20 12:00  05/08/20 12:00  05/08/20 12:00  05/08/20 12:00  05/08/20 12:00








General: Alert, In no apparent distress, Oriented x3, Cooperative


HEENT: Atraumatic


Neck: Supple


Respiratory: Clear to auscultation bilaterally, Normal air movement


Cardiovascular: Irregular heart rate/rhythm (AFib rate better controlled)


Gastrointestinal: Normal bowel sounds, Soft and benign, Non-distended


Integumentary: No erythema, No warmth, No cyanosis


Neurological: Normal speech, Normal strength at 5/5 x4 extr, Normal tone, Normal

affect


Laboratory Data at Discharge: 














WBC  7.2 K/uL (4.3-10.9)   05/08/20  05:10    


 


Hgb  15.3 g/dL (13.6-17.9)   05/08/20  05:10    


 


Hct  45.2 % (39.6-49.0)   05/08/20  05:10    


 


Plt Count  163 K/uL (152-406)   05/08/20  05:10    


 


PT  12.6 SECONDS (9.5-12.5)  H  05/07/20  15:55    


 


INR  1.07   05/07/20  15:55    


 


Sodium  137 mmol/L (136-145)   05/08/20  05:10    


 


Potassium  4.0 mmol/L (3.5-5.1)   05/08/20  05:10    


 


BUN  13 mg/dL (7-18)   05/08/20  05:10    


 


Creatinine  0.71 mg/dL (0.55-1.3)   05/08/20  05:10    


 


Glucose  92 mg/dL ()   05/08/20  05:10    


 


Magnesium  1.6 mg/dL (1.8-2.4)  L  05/08/20  05:10    


 


Total Bilirubin  0.5 mg/dL (0.2-1.0)   05/07/20  15:55    


 


AST  34 U/L (15-37)   05/07/20  15:55    


 


ALT  32 U/L (12-78)   05/07/20  15:55    


 


Alkaline Phosphatase  80 U/L ()   05/07/20  15:55    


 


Troponin I  < 0.02 ng/mL (0.0-0.045)   05/08/20  05:10    








Home Medications: 








Albuterol Sulfate [Proair Hfa] 8.5 gm IH BID 05/07/20 


Amlodipine [Norvasc*] 5 mg PO DAILY 05/07/20 


Lovastatin 20 mg PO BEDTIME 05/07/20 


Apixaban [Eliquis] 5 mg PO BID #60 tablet 05/08/20 


Losartan Potassium 25 mg PO DAILY #30 05/08/20 


Metoprolol Succinate [Toprol Xl*] 50 mg PO BID #60 tab 05/08/20 


Pantoprazole [Protonix Tab*] 40 mg PO DAILYAC #30 tab 05/08/20 





New Medications: 


Apixaban [Eliquis] 5 mg PO BID #60 tablet


Losartan Potassium 25 mg PO DAILY #30


Pantoprazole [Protonix Tab*] 40 mg PO DAILYAC #30 tab


Metoprolol Succinate [Toprol Xl*] 50 mg PO BID #60 tab


Patient Discharge Instructions: 1.  Recommend follow up with PCP in 1 week to 

follow up this hospitalization.  2.  Patient presented with dizziness.  Patient 

found to have new onset atrial fibrillation.  Patient was admitted for further 

evaluation.  Cardiology recommended rate control medication-metoprolol which he 

has been taking.  Patient also started on anti coagulation therapy.  Patient has

done well.  Echocardiogram unremarkable. At discharge patient will continue with

Toprol-XL 50 mg 1 pill twice daily and Eliquis 5 mg 1 pill twice daily.  

Education on a to fibrillation, Eliquis will be provided.  Recommend follow up 

with Cardiology in 1 week to follow up this hospitalization.  3.  Patient with 

underlying hypertension.  This has remained stable.  At discharge he will 

continue with Toprol-XL 50 mg 1 pill twice daily, Norvasc 5 mg daily, and Cozaar

25 mg daily.  Recommend to maintain blood pressure less 150/80.  Further 

adjustment to be done by his PCP or cardiology.  Patient with GERD.  At 

discharge patient may continue with his medication Protonix 40 mg daily.  4.  

Patient with hyperlipidemia.  This is remained stable.  At discharge will 

continue with lovastatin 20 mg daily.  5.  Patient with elevated alcohol level. 

Alcohol cessation education provided.  It will be important patient ceases 

alcohol as patient will be on chronic anti coagulation therapy.  This could be 

further monitored by cardiology and PCP.


Diet: AHA


Activity: Ad dale


Time spent managing pt's care (in minutes): 55

## 2020-05-08 NOTE — EKG
Test Date:    2020-05-07               Test Time:    15:45:21

Technician:   EMILIA                                    

                                                     

MEASUREMENT RESULTS:                                       

Intervals:                                           

Rate:         91                                     

KY:                                                  

QRSD:         82                                     

QT:           382                                    

QTc:          469                                    

Axis:                                                

P:                                                   

KY:                                                  

QRS:          168                                    

T:            -14                                    

                                                     

INTERPRETIVE STATEMENTS:                                       

                                                     

Atrial fibrillation

Right axis deviation

Moderate voltage criteria for LVH, may be normal variant

T wave abnormality, consider inferior ischemia or digitalis effect

Abnormal ECG

Compared to ECG 07/08/2017 02:04:51

Right-axis deviation now present

Left ventricular hypertrophy now present

Sinus rhythm no longer present

Left-axis deviation no longer present

T-wave abnormality still present

Possible ischemia still present



Electronically Signed On 05-08-20 10:34:49 CDT by Anoop Ham

## 2020-05-08 NOTE — RAD REPORT
EXAM DESCRIPTION:  RAD - Chest Pa And Lat (2 Views) - 5/8/2020 9:11 am

 

CLINICAL HISTORY:  cough, eval for chf

Chest pain.

 

COMPARISON:  Chest Single View dated 5/7/2020; Chest Single View dated 7/8/2017; CHEST SINGLE VIEW da
stefania 6/4/2015; CHEST PA AND LAT 2 VIEW dated 8/25/2014

 

FINDINGS:  The lungs are clear. The heart is mildly prominent size with a tortuous thoracic aorta. No
 displaced fractures.

## 2020-05-09 NOTE — CON
Date of Consultation:  05/08/2020



Reason For Consultation:  Atrial fibrillation.



History Of Present Illness:  Mr. Gutiérrez is a 80-year-old black male.  He is known to have chronic atri
al fibrillation.  Has had a history of prostate cancer, gastroesophageal reflux disease, hypertension
, dyslipidemia as well as COPD.  He fell on the back of his head, but no syncope.  He denied any ches
t pain, shortness of breath, nausea, vomiting, diaphoresis, PND, orthopnea, pedal edema, or palpitati
on.  When he was admitted, he was noted to have atrial fibrillation at rate of 115.  He was hypertens
petey, had positive opiates on his drug screen.  His workup otherwise was fairly unremarkable.  TSH was
 still pending.



Past Medical History:  As stated above.



Allergies:  NONE.



Review of Systems:

Negative.



Social History:  Negative.



Family History:  Noncontributory.



Medications:  At home include metformin, Norvasc, losartan, inhalers, and lovastatin.



Physical Examination:

Vital Signs:  He was in atrial fibrillation at rate 115.  No symptoms.  Blood pressure is 185/110. 

HEENT:  Negative. 

Neck:  Supple with no bruit. 

Chest:  Clear. 

Cardiac:  Revealed atrial fibrillation. 

Abdomen:  Benign. 

Extremities:  Revealed no clubbing, cyanosis, or edema.



Diagnostic Data:  As stated earlier.



Impression And Plan:  Chronic atrial fibrillation, rapid ventricular response.  I think he needs to b
e on beta-blockers, and I think he needs to be anticoagulated with Eliquis or Xarelto.  I think he ne
eds to have an echocardiogram, TSH.  His other problems including hypertension, chronic obstructive p
ulmonary disease, dyslipidemia, gastroesophageal reflux disease seem to be stable.  We will see what 
the echo shows, but I am comfortable with him going home on beta-blockers and an anticoagulant and I 
will see him in the office as an outpatient.  Patient is not having any cardiac symptoms and he did n
ot have any syncope.





NB/LIDIA

DD:  05/09/2020 06:07:04Voice ID:  [id number]

DT:  05/09/2020 06:24:39Report ID:  [id number]

## 2022-03-13 ENCOUNTER — HOSPITAL ENCOUNTER (EMERGENCY)
Dept: HOSPITAL 97 - ER | Age: 82
Discharge: ANOTHER HOSPITAL | End: 2022-03-13
Payer: COMMERCIAL

## 2022-03-13 VITALS — DIASTOLIC BLOOD PRESSURE: 99 MMHG | OXYGEN SATURATION: 97 % | SYSTOLIC BLOOD PRESSURE: 143 MMHG | TEMPERATURE: 97.8 F

## 2022-03-13 DIAGNOSIS — K21.9: ICD-10-CM

## 2022-03-13 DIAGNOSIS — Z20.822: ICD-10-CM

## 2022-03-13 DIAGNOSIS — I48.91: ICD-10-CM

## 2022-03-13 DIAGNOSIS — K92.2: Primary | ICD-10-CM

## 2022-03-13 DIAGNOSIS — I10: ICD-10-CM

## 2022-03-13 DIAGNOSIS — E78.00: ICD-10-CM

## 2022-03-13 LAB
ALBUMIN SERPL BCP-MCNC: 3.2 G/DL (ref 3.4–5)
ALP SERPL-CCNC: 69 U/L (ref 45–117)
ALT SERPL W P-5'-P-CCNC: 20 U/L (ref 12–78)
AST SERPL W P-5'-P-CCNC: 19 U/L (ref 15–37)
BUN BLD-MCNC: 14 MG/DL (ref 7–18)
GLUCOSE SERPLBLD-MCNC: 130 MG/DL (ref 74–106)
HCT VFR BLD CALC: 43.4 % (ref 39.6–49)
INR BLD: 1.19
LYMPHOCYTES # SPEC AUTO: 1 K/UL (ref 0.7–4.9)
MAGNESIUM SERPL-MCNC: 1.8 MG/DL (ref 1.8–2.4)
NT-PROBNP SERPL-MCNC: 472 PG/ML (ref ?–450)
PMV BLD: 8.9 FL (ref 7.6–11.3)
POTASSIUM SERPL-SCNC: 3.6 MMOL/L (ref 3.5–5.1)
RBC # BLD: 4.56 M/UL (ref 4.33–5.43)
TROPONIN I SERPL HS-MCNC: 6.9 PG/ML (ref ?–58.9)

## 2022-03-13 PROCEDURE — 99285 EMERGENCY DEPT VISIT HI MDM: CPT

## 2022-03-13 PROCEDURE — 84484 ASSAY OF TROPONIN QUANT: CPT

## 2022-03-13 PROCEDURE — 80048 BASIC METABOLIC PNL TOTAL CA: CPT

## 2022-03-13 PROCEDURE — 86900 BLOOD TYPING SEROLOGIC ABO: CPT

## 2022-03-13 PROCEDURE — 80076 HEPATIC FUNCTION PANEL: CPT

## 2022-03-13 PROCEDURE — 83735 ASSAY OF MAGNESIUM: CPT

## 2022-03-13 PROCEDURE — 85025 COMPLETE CBC W/AUTO DIFF WBC: CPT

## 2022-03-13 PROCEDURE — 80320 DRUG SCREEN QUANTALCOHOLS: CPT

## 2022-03-13 PROCEDURE — 86901 BLOOD TYPING SEROLOGIC RH(D): CPT

## 2022-03-13 PROCEDURE — 85610 PROTHROMBIN TIME: CPT

## 2022-03-13 PROCEDURE — 93005 ELECTROCARDIOGRAM TRACING: CPT

## 2022-03-13 PROCEDURE — 71045 X-RAY EXAM CHEST 1 VIEW: CPT

## 2022-03-13 PROCEDURE — 86850 RBC ANTIBODY SCREEN: CPT

## 2022-03-13 PROCEDURE — 83880 ASSAY OF NATRIURETIC PEPTIDE: CPT

## 2022-03-13 PROCEDURE — 36415 COLL VENOUS BLD VENIPUNCTURE: CPT

## 2022-03-13 NOTE — EDPHYS
Physician Documentation                                                                           

 [facility]                                                                 

Name: Clifton Gutiérrez                                                                                

Age: 81 yrs                                                                                       

Sex: Male                                                                                         

: 1940                                                                                   

MRN: [id number]                                                                                   

Arrival Date: 2022                                                                          

Time: 10:55                                                                                       

Account#: [id number]                                                                            

Bed 7                                                                                             

Private MD:                                                                                       

ED Physician Fortino Beatty                                                                      

HPI:                                                                                              

                                                                                             

11:00 This 81 yrs old Black Male presents to ER via EMS with complaints of Chest Pain.        pm1 

11:00 The patient or guardian reports chest pain that is located primarily in the mid-sternal pm1 

      area. Onset: today, at 02:00. The pain radiates to back and forth between right and         

      left chest. Associated signs and symptoms: Pertinent negatives: abdominal pain, cough,      

      dizziness, headache, nausea, shortness of breath, vomiting, diarrhea. The chest pain is     

      described as sharp. Duration: The patient or guardian reports multiple episodes, that       

      wax and wane. Modifying factors: The symptoms are alleviated by nothing. the symptoms       

      are aggravated by nothing. Severity of pain: in the emergency department the pain is a      

      8 / 10. EMS care prior to arrival includes: aspirin. The patient has not experienced        

      similar symptoms in the past. The patient has not recently seen a physician. Patient        

      presents to the ER with complaints of chest pain that started at 2 AM today. Patient        

      was awake at that time. The pain lasted approximately 2 hours and then resolved.            

      Patient went to sleep and then the pain resolved and returned a few times. Prior to         

      arrival his chest pain lasted for 1 hour.                                                   

13:19 Patient drank 1 pint crown shashi, whiskey yesterday. Drank similar amount of alcohol 3 pm1 

      days prior. Patient drinks heavy 2-3 times per week. Patient denies alcohol withdrawal      

      or seizures. Drinking like this his whole life per wife.                                    

                                                                                                  

Historical:                                                                                       

- Allergies:                                                                                      

11:00 No Known Allergies;                                                                     ph  

- Home Meds:                                                                                      

11:00 amlodipine 5 mg tab 1 tab once daily [Active]; losartan 25 mg Oral tab 1 tab once daily ph  

      [Active]; lovastatin 20 mg Oral tab 1 tab once daily [Active]; metoprolol tartrate 50       

      mg Oral tab 1 tab 2 times per day [Active];                                                 

- PMHx:                                                                                           

11:00 Atrial Fib; Cancer; GERD; High Cholesterol; Hypertension; Prostate Cancer;              ph  

                                                                                                  

- Immunization history:: Adult Immunizations unknown.                                             

- Social history:: Smoking status: Patient denies any tobacco usage or history of.                

                                                                                                  

                                                                                                  

ROS:                                                                                              

11:00 Constitutional: Negative for fever, chills, and weight loss, Respiratory: Negative for  pm1 

      shortness of breath, cough, wheezing, and pleuritic chest pain.                             

11:00 Abdomen/GI: Negative for abdominal pain, nausea, vomiting, diarrhea, and constipation,      

      Back: Negative for injury and pain, MS/Extremity: Negative for injury and deformity,        

      Skin: Negative for injury, rash, and discoloration, Neuro: Negative for headache,           

      weakness, numbness, tingling, and seizure.                                                  

11:00 Cardiovascular: Positive for chest pain, Negative for edema.                                

11:00 All other systems are negative.                                                             

                                                                                                  

Exam:                                                                                             

11:00 Constitutional:  This is a well developed, well nourished patient who is awake, alert,  pm1 

      and in no acute distress. Head/Face:  Normocephalic, atraumatic.                            

11:00 Back:  No spinal tenderness.  No costovertebral tenderness.  Full range of motion.          

      Skin:  Warm, dry with normal turgor.  Normal color with no rashes, no lesions, and no       

      evidence of cellulitis. MS/ Extremity:  Pulses equal, no cyanosis.  Neurovascular           

      intact.  Full, normal range of motion.                                                      

11:00 ENT: Mouth: no acute changes, Lips: normal, moist, Oral mucosa: normal, pink and            

      intact, moist.                                                                              

11:00 Cardiovascular: Exam negative for  acute changes, Rate: tachycardic, actual rate is         

      105 bpm, Rhythm: irregular, Pulses: no pulse deficits are appreciated, Heart sounds:        

      normal, normal S1and S2, Edema:                                                             

11:00 Respiratory: Exam negative for  acute changes, respiratory distress, shortness of           

      breath, Breath sounds: are clear throughout.                                                

11:00 Neuro: Exam negative for acute changes, Orientation: is normal, Mentation: is normal,       

      Motor: is normal, moves all fours.                                                          

12:52 Abdomen/GI: small amount, approximately 60 mL of coffee ground emesis present in basin. pm1 

14:29 Abdomen/GI: Rectal exam: rectal tone normal, Stool: brown, guaiac negative, Chaperone:  pm1 

      Bianca intern RN nurse.                                                                       

                                                                                                  

Vital Signs:                                                                                      

10:55  / 98; Pulse 105; Resp 18; Temp 97.9; Pulse Ox 95% ; Weight 79.38 kg; Height 5    ph  

      ft. 6 in. (167.64 cm);                                                                      

12:28 BP 97 / 70; Pulse 124; Resp 22; Pulse Ox 100% on R/A;                                   ph  

13:33  / 84; Pulse 79; Resp 18; Pulse Ox 98% on R/A;                                    ph  

14:31  / 98; Pulse 116; Resp 15; Pulse Ox 95% ;                                         jl7 

15:21  / 99; Pulse 115; Resp 18; Temp 97.8; Pulse Ox 97% on R/A;                        ph  

10:55 Body Mass Index 28.25 (79.38 kg, 167.64 cm)                                             ph  

                                                                                                  

MDM:                                                                                              

11:00 Patient medically screened.                                                             pm1 

11:03 Data reviewed: vital signs.                                                             pm1 

14:39 Physician consultation: MD Quinn was contacted at 14:39, regarding patient's condition,  pm1 

      and will see patient.                                                                       

                                                                                                  

                                                                                             

11:00 Order name: Basic Metabolic Panel                                                       pm1 

                                                                                             

11:00 Order name: CBC with Diff                                                               pm1 

                                                                                             

11:00 Order name: LFT's                                                                       pm1 

                                                                                             

11:00 Order name: Magnesium; Complete Time: 11:37                                             pm1 

                                                                                             

11:00 Order name: NT PRO-BNP; Complete Time: 11:37                                            pm1 

                                                                                             

11:00 Order name: PT-INR; Complete Time: 11:37                                                pm1 

                                                                                             

11:00 Order name: Troponin HS; Complete Time: 11:37                                           pm1 

                                                                                             

11:01 Order name: Basic Metabolic Panel; Complete Time: 11:37                                 EDMS

                                                                                             

11:01 Order name: CBC with Automated Diff; Complete Time: 11:37                               EDMS

                                                                                             

11:01 Order name: Liver (Hepatic) Function; Complete Time: 11:37                              EDMS

                                                                                             

12:52 Order name: ETOH Level; Complete Time: 14:08                                            pm1 

                                                                                             

13:08 Order name: COVID-19 SARS RT PCR (Document "Date of Onset" if Symptomatic); Complete    pm1 

      Time: 14:22                                                                                 

                                                                                             

13:08 Order name: Type And Screen; Complete Time: 15:50                                       pm1 

                                                                                             

14:31 Order name: Lactate                                                                     pm1 

                                                                                             

11:00 Order name: XRAY Chest (1 view); Complete Time: 12:35                                   pm1 

                                                                                             

11:00 Order name: EKG; Complete Time: 11:01                                                   pm1 

                                                                                             

11:00 Order name: Cardiac monitoring; Complete Time: 11:                                    pm                                                                                             

11:00 Order name: EKG - Nurse/Tech; Complete Time: :                                      pm                                                                                             

11:00 Order name: IV Saline Lock; Complete Time: :                                        pm                                                                                             

11:00 Order name: Labs collected and sent; Complete Time: :                               pm                                                                                             

11:00 Order name: O2 Per Protocol; Complete Time:                                                                                              

11:00 Order name: O2 Sat Monitoring; Complete Time: :                                     pm 

                                                                                                  

Administered Medications:                                                                         

11:15 Drug: Nitroglycerin 0.4 mg Route: Sublingual;                                           ph  

13:32 Follow up: Response: No adverse reaction; Pain is unchanged, physician notified         ph  

12:50 Drug: NS 0.9% 500 ml Volume: 500 ml; Route: IV; Rate: 1 bolus; Site: right antecubital; ph  

13:33 Follow up: Response: No adverse reaction; IV Status: Completed infusion; IV Intake:     ph  

      500ml                                                                                       

12:53 Drug: Zofran (Ondansetron) 4 mg Route: IVP; Site: right antecubital;                    ph  

13:32 Follow up: Response: No adverse reaction; Nausea is decreased; Vomiting decreased       ph  

12:55 Drug: fentaNYL (PF) 25 mcg Route: IVP; Site: right antecubital;                         ph  

13:32 Follow up: Response: No adverse reaction; Pain is decreased; RASS: Drowsy (-1)          ph  

13:25 Drug: Octreotide 50 mcg Route: IV; Rate: calculated rate; Site: right antecubital;      ph  

15:22 Follow up: Response: No adverse reaction; IV Status: Completed infusion                 ph  

13:26 Drug: ProTONIX (pantoprazole) 40 mg Route: IVP; Site: right antecubital;                ph  

13:32 Follow up: Response: No adverse reaction                                                ph  

13:56 Drug: ProTONIX (pantoprazole) 8 mg/hr Route: IV; Rate: 25 ml/hr; Site: right            jl7 

      antecubital;                                                                                

15:22 Follow up: Response: No adverse reaction; IV Status: Infusion continued upon transfer   ph  

13:57 Drug: Octreotide Infusion (50 mcg/hr) - (Octreotide 500 mcg, NS 0.9% 500 ml) Route: IV; jl7 

      Rate: 50 ml/hr; Site: left hand;                                                            

15:22 Follow up: Response: No adverse reaction; IV Status: Infusion continued upon transfer   ph  

                                                                                                  

                                                                                                  

Disposition:                                                                                      

18:31 Co-signature as Attending Physician, Fortino GREENBERG I agree with the assessment and  mandy 

      plan of care.                                                                               

                                                                                                  

Disposition Summary:                                                                              

22 14:41                                                                                    

Transfer Ordered                                                                                  

      Transfer Location: [facility]                                                          pm1 

      Reason: Higher level of care                                                            pm1 

      Condition: Stable                                                                       pm1 

      Problem: new                                                                            pm1 

      Symptoms: have improved                                                                 pm1 

      Accepting Physician: Kai(22 16:38)                                               ph  

      Diagnosis                                                                                   

        - GI Bleed/ Gastrointestinal hemorrhage, unspecified - Upper GI bleed                 pm1 

      Forms:                                                                                      

        - Medication Reconciliation Form                                                      pm1 

        - SBAR form                                                                           pm1 

Signatures:                                                                                       

Dispatcher [employer]                           EDFortino Garcia MD MD cha Hall, Patricia, RN                      RN   ph                                                   

Aden Hollis, GONZALEZ                    NP   pm1                                                  

Brandy Hung RN                        RN   jl7                                                  

                                                                                                  

Corrections: (The following items were deleted from the chart)                                    

16:38 14:41 Kai pm1                                                                          ph  

                                                                                                  

**************************************************************************************************

## 2022-03-13 NOTE — RAD REPORT
EXAM DESCRIPTION:  RAD - Chest Single View - 3/13/2022 12:05 pm

 

CLINICAL HISTORY:  CHEST PAIN

 

COMPARISON:  Chest Pa And Lat (2 Views) dated 5/8/2020; Chest Single View dated 5/7/2020; Chest Singl
e View dated 7/8/2017; CHEST SINGLE VIEW dated 6/4/2015

 

FINDINGS:  Lines: None.

Lungs: No evidence of edema or pneumonia.

Pleural: No significant pleural effusions or pneumothorax.

Cardiac: The heart size is within normal limits.

Bones: No acute fractures.

Other:

 

IMPRESSION:  No acute cardiopulmonary disease.

## 2022-03-13 NOTE — XMS REPORT
Continuity of Care Document

                            Created on:2022



Patient:ALEJANDRA JUAREZ

Sex:Male

:1940

External Reference #:[id number]





Demographics







                          Address                   [address] [place] [id number]

 

                          Home Phone                [phone]

 

                          Email Address             NONE

 

                          Preferred Language        Unknown

 

                          Marital Status            Unknown

 

                          Oriental orthodox Affiliation     Unknown

 

                          Race                      Unknown

 

                          Additional Race(s)        Unavailable

 

                          Ethnic Group              Unknown









Author







                          Organization              [facility]

t

 

                          Address                   [address] [place] [address] Dr. Bryant [address] [place] [id number]

 

                          Phone                     [phone]









Care Team Providers







                    Name                Role                Phone

 

                    Unavailable         Unavailable         Unavailable









Problems

This patient has no known problems.



Allergies, Adverse Reactions, Alerts

This patient has no known allergies or adverse reactions.



Medications

This patient has no known medications.



Procedures

This patient has no known procedures.



Results

This patient has no known results.

## 2022-03-13 NOTE — ER
Nurse's Notes                                                                                     

 [facility]                                                                 

Name: Clifton Gutiérrez                                                                                

Age: 81 yrs                                                                                       

Sex: Male                                                                                         

: 1940                                                                                   

MRN: [id number]                                                                                   

Arrival Date: 2022                                                                          

Time: 10:55                                                                                       

Account#: [id number]                                                                            

Bed 7                                                                                             

Private MD:                                                                                       

Diagnosis: GI Bleed/ Gastrointestinal hemorrhage, unspecified-Upper GI bleed                      

                                                                                                  

Presentation:                                                                                     

                                                                                             

10:55 Chief complaint: EMS states: Pt c/o mid-sternal chest pain that began at approx 2 am    ph  

      this morning when he got up to use the restroom. Describes pain as intermittent and         

      squeezing. Denies N/V, or SOB. Hx of GERD and HTN, /80, HR 99 Spo2 98% RA.            

      Coronavirus screen: Vaccine status: Patient reports receiving the 2nd dose of the covid     

      vaccine. At this time, the client does not indicate any symptoms associated with            

      coronavirus-19. Ebola Screen: No symptoms or risks identified at this time. Initial         

      Sepsis Screen: Does the patient meet any 2 criteria? No. Patient's initial sepsis           

      screen is negative. Does the patient have a suspected source of infection? No.              

      Patient's initial sepsis screen is negative. Risk Assessment: Do you want to hurt           

      yourself or someone else? Patient reports no desire to harm self or others. Onset of        

      symptoms was 2022.                                                                

10:55 Method Of Arrival: EMS: [facility]  

10:55 Acuity: YASMEEN 3                                                                           ph  

                                                                                                  

Triage Assessment:                                                                                

11:01 General: Appears in no apparent distress. comfortable, Behavior is calm, cooperative,   ph  

      appropriate for age, Denies fever, feeling ill. Pain: Complains of pain in mid-sternal      

      area Pain radiates to diaphragm Quality of pain is described as sharp, squeezing.           

      Neuro: Level of Consciousness is awake, alert, obeys commands, Oriented to person,          

      place, time, situation. Cardiovascular: Reports chest pain, Denies diaphoresis,             

      lightheadedness, nausea, shortness of breath, Capillary refill < 3 seconds in bilateral     

      fingers Patient's skin is warm and dry. Rhythm is irregular Chest pain quality is           

      sharp, squeezing. Respiratory: Airway is patent Respiratory effort is even, unlabored,      

      Respiratory pattern is regular, symmetrical. GI: Reports epigastric pain, Patient           

      currently denies diarrhea, nausea, vomiting. : No signs and/or symptoms were reported     

      regarding the genitourinary system. Derm: Skin is intact, Skin is pink, warm \T\ dry.       

      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      

      palmar aspect of proximal phalanx of right middle finger and palmar aspect of proxima;      

      phalanx of right index finger. Circulation, motion, and sensation intact. Range of          

      motion: intact in all extremities.                                                          

                                                                                                  

Historical:                                                                                       

- Allergies:                                                                                      

11:00 No Known Allergies;                                                                     ph  

- Home Meds:                                                                                      

11:00 amlodipine 5 mg tab 1 tab once daily [Active]; losartan 25 mg Oral tab 1 tab once daily ph  

      [Active]; lovastatin 20 mg Oral tab 1 tab once daily [Active]; metoprolol tartrate 50       

      mg Oral tab 1 tab 2 times per day [Active];                                                 

- PMHx:                                                                                           

11:00 Atrial Fib; Cancer; GERD; High Cholesterol; Hypertension; Prostate Cancer;              ph  

                                                                                                  

- Immunization history:: Adult Immunizations unknown.                                             

- Social history:: Smoking status: Patient denies any tobacco usage or history of.                

                                                                                                  

                                                                                                  

Screenin:03 Abuse screen: Denies threats or abuse. Denies injuries from another. Nutritional        ph  

      screening: No deficits noted. Tuberculosis screening: No symptoms or risk factors           

      identified. Fall Risk None identified.                                                      

                                                                                                  

Assessment:                                                                                       

12:55 Reassessment: Patient appears in no apparent distress at this time. Patient and/or      ph  

      family updated on plan of care and expected duration. Pain level reassessed. Patient is     

      alert, oriented x 3, equal unlabored respirations, skin warm/dry/pink. Pt reports that      

      chest pain has increased, no relief from nitro, ERP notified and order received for IV      

      pain medication. Returned to pt's room and wife states, " He just threw up the lining       

      of his stomach again. He's had to be in the hospital for that before." Wife also            

      states, " He likes to drink that alcohol too." Reports that pt is not a daily drinker       

      but drinks regularly. States, " He don't drink every day but when he does he will           

      finish the bottle." Small amount of coffee ground emesis noted in basin. Guaiac + for       

      blood. GONZALEZ Samaniego notified, additional labs ordered.                                       

14:00 Reassessment: Patient appears in no apparent distress at this time. Patient and/or      ph  

      family updated on plan of care and expected duration. Pain level reassessed. Patient is     

      alert, oriented x 3, equal unlabored respirations, skin warm/dry/pink. Patient denies       

      pain at this time.                                                                          

15:31 Reassessment: Patient appears in no apparent distress at this time. Patient and/or      ph  

      family updated on plan of care and expected duration. Pain level reassessed. Patient is     

      alert, oriented x 3, equal unlabored respirations, skin warm/dry/pink. Report called to     

      Zuleyma GREENE at [facility], awaiting EMS for transport.                                     

16:30 Reassessment: Patient appears in no apparent distress at this time. Patient and/or      ph  

      family updated on plan of care and expected duration. Pain level reassessed. Patient is     

      alert, oriented x 3, equal unlabored respirations, skin warm/dry/pink. [facility]     

      at bedside, pt transferred to [facility].                                               

                                                                                                  

Vital Signs:                                                                                      

10:55  / 98; Pulse 105; Resp 18; Temp 97.9; Pulse Ox 95% ; Weight 79.38 kg; Height 5    ph  

      ft. 6 in. (167.64 cm);                                                                      

12:28 BP 97 / 70; Pulse 124; Resp 22; Pulse Ox 100% on R/A;                                   ph  

13:33  / 84; Pulse 79; Resp 18; Pulse Ox 98% on R/A;                                    ph  

14:31  / 98; Pulse 116; Resp 15; Pulse Ox 95% ;                                         jl7 

15:21  / 99; Pulse 115; Resp 18; Temp 97.8; Pulse Ox 97% on R/A;                        ph  

10:55 Body Mass Index 28.25 (79.38 kg, 167.64 cm)                                             ph  

                                                                                                  

Vitals:                                                                                           

12:28 Cardiac Rhythm Assessment Atrial fibrillation.                                          ph  

                                                                                                  

ED Course:                                                                                        

10:55 Patient arrived in ED.                                                                  ph  

11:00 Aden Hollis NP is PHCP.                                                           pm1 

11:00 Fortino Beatty MD is Attending Physician.                                             pm1 

11:00 Triage completed.                                                                       ph  

11:01 Arm band placed on Patient placed in an exam room, on a stretcher, on cardiac monitor,  ph  

      on pulse oximetry.                                                                          

11:03 Patient has correct armband on for positive identification. Bed in low position. Call     

      light in reach. Side rails up X 1. Cardiac monitor on. Pulse ox on. NIBP on. Door           

      closed. Noise minimized. Warm blanket given.                                                

11:03 Patient maintains SpO2 saturation greater than 95% on room air.                         ph  

11:08 Inserted saline lock: 20 gauge in right forearm, using aseptic technique. Blood         jw7 

      collected.                                                                                  

11:11 Anastasia Alcocer, RN is Primary Nurse.                                                    ph  

11:21 EKG done, by ED staff, reviewed by Aden Hollis NP.                                  jw7 

12:05 XRAY Chest (1 view) In Process Unspecified.                                             EDMS

13:30 Inserted saline lock: 22 gauge in left hand, using aseptic technique.                   ph  

15:22 No provider procedures requiring assistance completed. Patient transferred, IV remains  ph  

      in place.                                                                                   

15:23 1425 started transfer to [facility].                                                          sp  

15:25 1432 Dr. DAVIDA Quinn accepted pt [address]38 [facility] admin approval.                        sp  

                                                                                                  

Administered Medications:                                                                         

11:15 Drug: Nitroglycerin 0.4 mg Route: Sublingual;                                           ph  

13:32 Follow up: Response: No adverse reaction; Pain is unchanged, physician notified         ph  

12:50 Drug: NS 0.9% 500 ml Volume: 500 ml; Route: IV; Rate: 1 bolus; Site: right antecubital; ph  

13:33 Follow up: Response: No adverse reaction; IV Status: Completed infusion; IV Intake:     ph  

      500ml                                                                                       

12:53 Drug: Zofran (Ondansetron) 4 mg Route: IVP; Site: right antecubital;                    ph  

13:32 Follow up: Response: No adverse reaction; Nausea is decreased; Vomiting decreased       ph  

12:55 Drug: fentaNYL (PF) 25 mcg Route: IVP; Site: right antecubital;                         ph  

13:32 Follow up: Response: No adverse reaction; Pain is decreased; RASS: Drowsy (-1)          ph  

13:25 Drug: Octreotide 50 mcg Route: IV; Rate: calculated rate; Site: right antecubital;      ph  

15:22 Follow up: Response: No adverse reaction; IV Status: Completed infusion                 ph  

13:26 Drug: ProTONIX (pantoprazole) 40 mg Route: IVP; Site: right antecubital;                ph  

13:32 Follow up: Response: No adverse reaction                                                ph  

13:56 Drug: ProTONIX (pantoprazole) 8 mg/hr Route: IV; Rate: 25 ml/hr; Site: right            jl7 

      antecubital;                                                                                

15:22 Follow up: Response: No adverse reaction; IV Status: Infusion continued upon transfer   ph  

13:57 Drug: Octreotide Infusion (50 mcg/hr) - (Octreotide 500 mcg, NS 0.9% 500 ml) Route: IV; jl7 

      Rate: 50 ml/hr; Site: left hand;                                                            

15:22 Follow up: Response: No adverse reaction; IV Status: Infusion continued upon transfer   ph  

                                                                                                  

                                                                                                  

Intake:                                                                                           

13:33 IV: 500ml; Total: 500ml.                                                                ph  

                                                                                                  

Outcome:                                                                                          

14:41 ER care complete, transfer ordered by MD.                                               pm1 

16:38 Patient left the ED.                                                                    ph  

16:38 Transferred by ground EMS [facility] to [facility], Transfer ph  

      form completed. X-rays sent w/ patient.                                                     

16:38 Condition: stable                                                                           

16:38 Instructed on the need for admit.                                                           

                                                                                                  

Signatures:                                                                                       

Dispatcher [employer]                           EDMS                                                 

Yuliana Phillips Patricia, RN                      RN   Aden Tena, GONZALEZ                    NP   pm1                                                  

Brandy Hung RN                        RN   jl7                                                  

Bianca Waldrop                                  7                                                  

                                                                                                  

**************************************************************************************************